# Patient Record
Sex: FEMALE | Race: BLACK OR AFRICAN AMERICAN | NOT HISPANIC OR LATINO | Employment: OTHER | RURAL
[De-identification: names, ages, dates, MRNs, and addresses within clinical notes are randomized per-mention and may not be internally consistent; named-entity substitution may affect disease eponyms.]

---

## 2020-10-12 ENCOUNTER — OFFICE VISIT (OUTPATIENT)
Dept: FAMILY MEDICINE CLINIC | Facility: CLINIC | Age: 45
End: 2020-10-12

## 2020-10-12 VITALS
WEIGHT: 239.8 LBS | BODY MASS INDEX: 44.13 KG/M2 | OXYGEN SATURATION: 99 % | HEART RATE: 113 BPM | HEIGHT: 62 IN | SYSTOLIC BLOOD PRESSURE: 130 MMHG | DIASTOLIC BLOOD PRESSURE: 88 MMHG

## 2020-10-12 DIAGNOSIS — Z13.220 SCREENING CHOLESTEROL LEVEL: ICD-10-CM

## 2020-10-12 DIAGNOSIS — K21.9 GASTROESOPHAGEAL REFLUX DISEASE, UNSPECIFIED WHETHER ESOPHAGITIS PRESENT: ICD-10-CM

## 2020-10-12 DIAGNOSIS — R53.83 FATIGUE, UNSPECIFIED TYPE: ICD-10-CM

## 2020-10-12 DIAGNOSIS — J30.2 SEASONAL ALLERGIES: ICD-10-CM

## 2020-10-12 DIAGNOSIS — J01.00 ACUTE NON-RECURRENT MAXILLARY SINUSITIS: Primary | ICD-10-CM

## 2020-10-12 PROCEDURE — 99203 OFFICE O/P NEW LOW 30 MIN: CPT | Performed by: STUDENT IN AN ORGANIZED HEALTH CARE EDUCATION/TRAINING PROGRAM

## 2020-10-12 RX ORDER — FLUTICASONE PROPIONATE 50 MCG
2 SPRAY, SUSPENSION (ML) NASAL DAILY
Qty: 1 BOTTLE | Refills: 6 | Status: SHIPPED | OUTPATIENT
Start: 2020-10-12 | End: 2022-06-23

## 2020-10-12 RX ORDER — OMEPRAZOLE 40 MG/1
40 CAPSULE, DELAYED RELEASE ORAL DAILY
Qty: 90 CAPSULE | Refills: 0 | Status: SHIPPED | OUTPATIENT
Start: 2020-10-12 | End: 2022-03-22

## 2020-10-12 RX ORDER — AMOXICILLIN AND CLAVULANATE POTASSIUM 875; 125 MG/1; MG/1
1 TABLET, FILM COATED ORAL 2 TIMES DAILY
Qty: 10 TABLET | Refills: 0 | Status: SHIPPED | OUTPATIENT
Start: 2020-10-12 | End: 2021-06-29

## 2020-10-12 RX ORDER — CETIRIZINE HYDROCHLORIDE 10 MG/1
10 TABLET ORAL DAILY
Qty: 30 TABLET | Refills: 5 | Status: SHIPPED | OUTPATIENT
Start: 2020-10-12 | End: 2022-03-22

## 2020-10-12 RX ORDER — ACETAMINOPHEN 500 MG
500 TABLET ORAL EVERY 6 HOURS PRN
COMMUNITY
End: 2023-01-13 | Stop reason: SDUPTHER

## 2020-10-12 NOTE — PROGRESS NOTES
Subjective:  Cb Lord is a 45 y.o. female who presents to establish care    GERD; present for years; has tried Mylanta and Tums with limited relief; has early satiety, epigastric pain, nausea with eating; denies hematochezia, hematemesis; spicy food and milk products are worse;     Sinus infection; has had head fullness for 2 months; poorly controlled seasonal allergies; does not take anything; endorses postnasal drip; morning cough; states has had a subjective fever recently as well as fatigue; associated with headache that is worse with bending over; and decreased hearing of right ear    Former smoker; quit when told she had spots on her lungs; x-ray was taken at [facility]; reports that size is stable; has not had appropriate follow-up; no hemoptysis, chronic cough, shortness of breath, or wheezing    Current Outpatient Medications:   •  acetaminophen (TYLENOL) 500 MG tablet, Take 500 mg by mouth Every 6 (Six) Hours As Needed for Mild Pain ., Disp: , Rfl:   •  amoxicillin-clavulanate (Augmentin) 875-125 MG per tablet, Take 1 tablet by mouth 2 (Two) Times a Day., Disp: 10 tablet, Rfl: 0  •  cetirizine (zyrTEC) 10 MG tablet, Take 1 tablet by mouth Daily., Disp: 30 tablet, Rfl: 5  •  fluticasone (Flonase) 50 MCG/ACT nasal spray, 2 sprays into the nostril(s) as directed by provider Daily., Disp: 1 bottle, Rfl: 6  •  omeprazole (priLOSEC) 40 MG capsule, Take 1 capsule by mouth Daily., Disp: 90 capsule, Rfl: 0    Review of Systems  Review of Systems   Constitutional: Negative for appetite change and fever.   HENT: Positive for congestion, ear discharge, hearing loss, postnasal drip, rhinorrhea, sinus pressure, sinus pain and sore throat.    Eyes: Negative for discharge and visual disturbance.   Respiratory: Negative for cough and shortness of breath.    Cardiovascular: Negative for chest pain, palpitations and leg swelling.   Gastrointestinal: Negative for abdominal pain, diarrhea and vomiting.    Genitourinary: Negative for dysuria.   Skin: Negative for rash.   Neurological: Negative for light-headedness and headaches.   Psychiatric/Behavioral: Negative for agitation.       There is no problem list on file for this patient.    Past Surgical History:   Procedure Laterality Date   • CHOLECYSTECTOMY     • TUBAL ABDOMINAL LIGATION       Social History     Socioeconomic History   • Marital status: Single     Spouse name: Not on file   • Number of children: Not on file   • Years of education: Not on file   • Highest education level: Not on file   Tobacco Use   • Smoking status: Former Smoker   • Smokeless tobacco: Never Used   Substance and Sexual Activity   • Alcohol use: Not Currently   • Drug use: Never     Family History   Problem Relation Age of Onset   • Kidney disease Mother    • Kidney disease Father      No results found for any previous visit.      No image results found.    @[facility]@    There is no immunization history on file for this patient.    The following portions of the patient's history were reviewed and updated as appropriate: allergies, current medications, past family history, past medical history, past social history, past surgical history and problem list.        Physical Exam  Physical Exam  Constitutional:       Appearance: Normal appearance.   HENT:      Head: Normocephalic and atraumatic.        Right Ear: Tympanic membrane normal. There is impacted cerumen.      Left Ear: Tympanic membrane and ear canal normal.   Eyes:      General:         Right eye: No discharge.         Left eye: No discharge.      Conjunctiva/sclera: Conjunctivae normal.   Neck:      Musculoskeletal: Normal range of motion.   Cardiovascular:      Rate and Rhythm: Normal rate and regular rhythm.      Pulses: Normal pulses.      Heart sounds: Normal heart sounds. No murmur.   Pulmonary:      Effort: Pulmonary effort is normal. No respiratory distress.      Breath sounds: Normal breath sounds.   Abdominal:       General: There is no distension.      Palpations: Abdomen is soft.      Tenderness: There is abdominal tenderness ( Epigastric). There is no right CVA tenderness, left CVA tenderness, guarding or rebound.   Lymphadenopathy:      Cervical: No cervical adenopathy.   Neurological:      Mental Status: She is alert. Mental status is at baseline.   Psychiatric:         Mood and Affect: Mood normal.         Behavior: Behavior normal.         Assessment/Plan    Diagnosis Plan   1. Acute non-recurrent maxillary sinusitis  fluticasone (Flonase) 50 MCG/ACT nasal spray    cetirizine (zyrTEC) 10 MG tablet    amoxicillin-clavulanate (Augmentin) 875-125 MG per tablet   2. Gastroesophageal reflux disease, unspecified whether esophagitis present  omeprazole (priLOSEC) 40 MG capsule   3. Seasonal allergies  fluticasone (Flonase) 50 MCG/ACT nasal spray    cetirizine (zyrTEC) 10 MG tablet   4. Fatigue, unspecified type  Comprehensive Metabolic Panel    CBC & Differential   5. Screening cholesterol level  Lipid Panel      Orders Placed This Encounter   Procedures   • Comprehensive Metabolic Panel   • Lipid Panel   • CBC & Differential     Order Specific Question:   Manual Differential     Answer:   No     Uncontrolled allergies and sinusitis; will treat with allergy medications and amoxicillin clavulanate; counseled patient on treatment plan; decreased hearing improved with cerumen impaction removal via irrigation; patient tolerated well; no complications    GERD; will keep food diary; lactose intolerance likely contributory; with epigastric tenderness and early satiety; will start omeprazole; if unimproved will refer to GI for EGD and H. pylori testing    Fatigue; likely from sinus infection, labs as above    Cholesterol screening as above    Follow-up in 2 weeks, will obtain prior records; patient likely needs hep C screening, influenza vaccine, Tdap vaccine, Pap smear and repeat chest x-ray/low dose CT for reported lung mass; lung  CTAB today      This document has been electronically signed by Moshe Villar MD on October 12, 2020 17:43 CDT

## 2021-06-28 ENCOUNTER — TELEPHONE (OUTPATIENT)
Dept: FAMILY MEDICINE CLINIC | Facility: CLINIC | Age: 46
End: 2021-06-28

## 2021-06-29 ENCOUNTER — OFFICE VISIT (OUTPATIENT)
Dept: FAMILY MEDICINE CLINIC | Facility: CLINIC | Age: 46
End: 2021-06-29

## 2021-06-29 VITALS
HEIGHT: 62 IN | OXYGEN SATURATION: 98 % | RESPIRATION RATE: 20 BRPM | SYSTOLIC BLOOD PRESSURE: 122 MMHG | HEART RATE: 103 BPM | DIASTOLIC BLOOD PRESSURE: 84 MMHG | BODY MASS INDEX: 43.43 KG/M2 | WEIGHT: 236 LBS

## 2021-06-29 DIAGNOSIS — E55.9 VITAMIN D DEFICIENCY: ICD-10-CM

## 2021-06-29 DIAGNOSIS — K59.00 CONSTIPATION, UNSPECIFIED CONSTIPATION TYPE: ICD-10-CM

## 2021-06-29 DIAGNOSIS — Z91.89 AT RISK FOR SLEEP APNEA: ICD-10-CM

## 2021-06-29 DIAGNOSIS — I51.7 CARDIOMEGALY: ICD-10-CM

## 2021-06-29 DIAGNOSIS — Z09 HOSPITAL DISCHARGE FOLLOW-UP: Primary | ICD-10-CM

## 2021-06-29 DIAGNOSIS — D50.9 IRON DEFICIENCY ANEMIA, UNSPECIFIED IRON DEFICIENCY ANEMIA TYPE: ICD-10-CM

## 2021-06-29 PROCEDURE — 99214 OFFICE O/P EST MOD 30 MIN: CPT | Performed by: STUDENT IN AN ORGANIZED HEALTH CARE EDUCATION/TRAINING PROGRAM

## 2021-06-29 RX ORDER — AMLODIPINE BESYLATE 5 MG/1
5 TABLET ORAL DAILY
COMMUNITY
End: 2021-07-21 | Stop reason: SDUPTHER

## 2021-06-29 RX ORDER — FERROUS SULFATE 325(65) MG
325 TABLET ORAL
Qty: 90 TABLET | Refills: 3 | Status: SHIPPED | OUTPATIENT
Start: 2021-06-29 | End: 2021-07-19 | Stop reason: SDUPTHER

## 2021-06-29 RX ORDER — FERROUS SULFATE 325(65) MG
325 TABLET ORAL
COMMUNITY
End: 2021-06-29 | Stop reason: SDUPTHER

## 2021-06-29 RX ORDER — LANOLIN ALCOHOL/MO/W.PET/CERES
CREAM (GRAM) TOPICAL DAILY
COMMUNITY
End: 2021-07-19 | Stop reason: SDUPTHER

## 2021-06-29 RX ORDER — POLYETHYLENE GLYCOL 3350 17 G/17G
17 POWDER, FOR SOLUTION ORAL DAILY
Qty: 850 G | Refills: 2 | Status: SHIPPED | OUTPATIENT
Start: 2021-06-29 | End: 2022-03-22

## 2021-07-19 DIAGNOSIS — D50.9 IRON DEFICIENCY ANEMIA, UNSPECIFIED IRON DEFICIENCY ANEMIA TYPE: ICD-10-CM

## 2021-07-19 DIAGNOSIS — E55.9 VITAMIN D DEFICIENCY: ICD-10-CM

## 2021-07-20 ENCOUNTER — TELEPHONE (OUTPATIENT)
Dept: FAMILY MEDICINE CLINIC | Facility: CLINIC | Age: 46
End: 2021-07-20

## 2021-07-20 RX ORDER — LANOLIN ALCOHOL/MO/W.PET/CERES
1000 CREAM (GRAM) TOPICAL DAILY
Qty: 90 TABLET | Refills: 1 | Status: SHIPPED | OUTPATIENT
Start: 2021-07-20 | End: 2022-02-18 | Stop reason: SDUPTHER

## 2021-07-20 RX ORDER — FERROUS SULFATE 325(65) MG
325 TABLET ORAL
Qty: 90 TABLET | Refills: 0 | Status: SHIPPED | OUTPATIENT
Start: 2021-07-20 | End: 2022-02-18 | Stop reason: SDUPTHER

## 2021-07-21 ENCOUNTER — TELEPHONE (OUTPATIENT)
Dept: FAMILY MEDICINE CLINIC | Facility: CLINIC | Age: 46
End: 2021-07-21

## 2021-07-21 DIAGNOSIS — I10 ESSENTIAL HYPERTENSION: Primary | ICD-10-CM

## 2021-07-21 RX ORDER — AMLODIPINE BESYLATE 5 MG/1
5 TABLET ORAL DAILY
Qty: 90 TABLET | Refills: 1 | Status: SHIPPED | OUTPATIENT
Start: 2021-07-21 | End: 2021-11-04 | Stop reason: SDUPTHER

## 2021-08-09 ENCOUNTER — TELEPHONE (OUTPATIENT)
Dept: FAMILY MEDICINE CLINIC | Facility: CLINIC | Age: 46
End: 2021-08-09

## 2021-08-18 ENCOUNTER — TELEPHONE (OUTPATIENT)
Dept: FAMILY MEDICINE CLINIC | Facility: CLINIC | Age: 46
End: 2021-08-18

## 2021-08-18 NOTE — TELEPHONE ENCOUNTER
Pt's daughter called stating she needed refills on her BP meds and on her Iron. I looked and both medications have been filled on 7/20 and 7/21 and were for a 3 month supply. I told her that if they filled it partially, the number of pills that she was given would be on the bottle. I told her it was too soon to refill either one of them and she needed to call the pharmacy to make sure she got the right amount of pills. I asked if there was a possibility that pt took more than she was supposed to or if they could have accidentally spilled out and she denied both of those suggestions. She said she would call the pharmacy back.

## 2021-11-04 DIAGNOSIS — I10 ESSENTIAL HYPERTENSION: ICD-10-CM

## 2021-11-04 RX ORDER — AMLODIPINE BESYLATE 5 MG/1
5 TABLET ORAL DAILY
Qty: 90 TABLET | Refills: 1 | Status: SHIPPED | OUTPATIENT
Start: 2021-11-04 | End: 2022-03-22 | Stop reason: SDUPTHER

## 2021-11-04 NOTE — TELEPHONE ENCOUNTER
Rx Refill Note  Requested Prescriptions     Pending Prescriptions Disp Refills   • amLODIPine (NORVASC) 5 MG tablet 90 tablet 1     Sig: Take 1 tablet by mouth Daily.      Last office visit with prescribing clinician: 6/29/2021      Next office visit with prescribing clinician: 11/11/2021            Kwame Garcia Rep  11/04/21, 11:28 CDT     Potassium is 3.8 and her GFR is > 60. ([facility] lab results)

## 2021-11-10 ENCOUNTER — TELEPHONE (OUTPATIENT)
Dept: FAMILY MEDICINE CLINIC | Facility: CLINIC | Age: 46
End: 2021-11-10

## 2021-11-11 ENCOUNTER — LAB (OUTPATIENT)
Dept: LAB | Facility: HOSPITAL | Age: 46
End: 2021-11-11

## 2021-11-11 ENCOUNTER — OFFICE VISIT (OUTPATIENT)
Dept: FAMILY MEDICINE CLINIC | Facility: CLINIC | Age: 46
End: 2021-11-11

## 2021-11-11 VITALS
SYSTOLIC BLOOD PRESSURE: 140 MMHG | WEIGHT: 254 LBS | DIASTOLIC BLOOD PRESSURE: 90 MMHG | BODY MASS INDEX: 46.74 KG/M2 | HEIGHT: 62 IN | TEMPERATURE: 97.1 F | HEART RATE: 93 BPM | OXYGEN SATURATION: 99 %

## 2021-11-11 DIAGNOSIS — Z13.220 LIPID SCREENING: ICD-10-CM

## 2021-11-11 DIAGNOSIS — D50.9 IRON DEFICIENCY ANEMIA, UNSPECIFIED IRON DEFICIENCY ANEMIA TYPE: ICD-10-CM

## 2021-11-11 DIAGNOSIS — I10 ESSENTIAL HYPERTENSION: Primary | ICD-10-CM

## 2021-11-11 DIAGNOSIS — Z11.59 NEED FOR HEPATITIS C SCREENING TEST: ICD-10-CM

## 2021-11-11 LAB
ALBUMIN SERPL-MCNC: 4 G/DL (ref 3.5–5.2)
ALBUMIN/GLOB SERPL: 1.1 G/DL
ALP SERPL-CCNC: 91 U/L (ref 39–117)
ALT SERPL W P-5'-P-CCNC: 9 U/L (ref 1–33)
ANION GAP SERPL CALCULATED.3IONS-SCNC: 14.2 MMOL/L (ref 5–15)
AST SERPL-CCNC: 10 U/L (ref 1–32)
BASOPHILS # BLD AUTO: 0.03 10*3/MM3 (ref 0–0.2)
BASOPHILS NFR BLD AUTO: 0.3 % (ref 0–1.5)
BILIRUB SERPL-MCNC: 0.2 MG/DL (ref 0–1.2)
BUN SERPL-MCNC: 8 MG/DL (ref 6–20)
BUN/CREAT SERPL: 12.1 (ref 7–25)
CALCIUM SPEC-SCNC: 9.4 MG/DL (ref 8.6–10.5)
CHLORIDE SERPL-SCNC: 105 MMOL/L (ref 98–107)
CHOLEST SERPL-MCNC: 164 MG/DL (ref 0–200)
CO2 SERPL-SCNC: 23.8 MMOL/L (ref 22–29)
CREAT SERPL-MCNC: 0.66 MG/DL (ref 0.57–1)
DEPRECATED RDW RBC AUTO: 38.8 FL (ref 37–54)
EOSINOPHIL # BLD AUTO: 0.24 10*3/MM3 (ref 0–0.4)
EOSINOPHIL NFR BLD AUTO: 2.3 % (ref 0.3–6.2)
ERYTHROCYTE [DISTWIDTH] IN BLOOD BY AUTOMATED COUNT: 14.1 % (ref 12.3–15.4)
FERRITIN SERPL-MCNC: 24.4 NG/ML (ref 13–150)
GFR SERPL CREATININE-BSD FRML MDRD: 118 ML/MIN/1.73
GLOBULIN UR ELPH-MCNC: 3.8 GM/DL
GLUCOSE SERPL-MCNC: 96 MG/DL (ref 65–99)
HCT VFR BLD AUTO: 41.3 % (ref 34–46.6)
HDLC SERPL-MCNC: 49 MG/DL (ref 40–60)
HGB BLD-MCNC: 13.4 G/DL (ref 12–15.9)
IMM GRANULOCYTES # BLD AUTO: 0.04 10*3/MM3 (ref 0–0.05)
IMM GRANULOCYTES NFR BLD AUTO: 0.4 % (ref 0–0.5)
IRON 24H UR-MRATE: 96 MCG/DL (ref 37–145)
IRON SATN MFR SERPL: 22 % (ref 20–50)
LDLC SERPL CALC-MCNC: 93 MG/DL (ref 0–100)
LDLC/HDLC SERPL: 1.84 {RATIO}
LYMPHOCYTES # BLD AUTO: 1.81 10*3/MM3 (ref 0.7–3.1)
LYMPHOCYTES NFR BLD AUTO: 17.5 % (ref 19.6–45.3)
MCH RBC QN AUTO: 25.3 PG (ref 26.6–33)
MCHC RBC AUTO-ENTMCNC: 32.4 G/DL (ref 31.5–35.7)
MCV RBC AUTO: 77.9 FL (ref 79–97)
MONOCYTES # BLD AUTO: 0.49 10*3/MM3 (ref 0.1–0.9)
MONOCYTES NFR BLD AUTO: 4.7 % (ref 5–12)
NEUTROPHILS NFR BLD AUTO: 7.75 10*3/MM3 (ref 1.7–7)
NEUTROPHILS NFR BLD AUTO: 74.8 % (ref 42.7–76)
NRBC BLD AUTO-RTO: 0 /100 WBC (ref 0–0.2)
PLATELET # BLD AUTO: 164 10*3/MM3 (ref 140–450)
PMV BLD AUTO: 12.5 FL (ref 6–12)
POTASSIUM SERPL-SCNC: 3.9 MMOL/L (ref 3.5–5.2)
PROT SERPL-MCNC: 7.8 G/DL (ref 6–8.5)
RBC # BLD AUTO: 5.3 10*6/MM3 (ref 3.77–5.28)
SODIUM SERPL-SCNC: 143 MMOL/L (ref 136–145)
TIBC SERPL-MCNC: 443 MCG/DL (ref 298–536)
TRANSFERRIN SERPL-MCNC: 297 MG/DL (ref 200–360)
TRIGL SERPL-MCNC: 124 MG/DL (ref 0–150)
VLDLC SERPL-MCNC: 22 MG/DL (ref 5–40)
WBC # BLD AUTO: 10.36 10*3/MM3 (ref 3.4–10.8)

## 2021-11-11 PROCEDURE — 85025 COMPLETE CBC W/AUTO DIFF WBC: CPT | Performed by: STUDENT IN AN ORGANIZED HEALTH CARE EDUCATION/TRAINING PROGRAM

## 2021-11-11 PROCEDURE — 80061 LIPID PANEL: CPT | Performed by: STUDENT IN AN ORGANIZED HEALTH CARE EDUCATION/TRAINING PROGRAM

## 2021-11-11 PROCEDURE — 82728 ASSAY OF FERRITIN: CPT | Performed by: STUDENT IN AN ORGANIZED HEALTH CARE EDUCATION/TRAINING PROGRAM

## 2021-11-11 PROCEDURE — 80053 COMPREHEN METABOLIC PANEL: CPT | Performed by: STUDENT IN AN ORGANIZED HEALTH CARE EDUCATION/TRAINING PROGRAM

## 2021-11-11 PROCEDURE — 86803 HEPATITIS C AB TEST: CPT | Performed by: STUDENT IN AN ORGANIZED HEALTH CARE EDUCATION/TRAINING PROGRAM

## 2021-11-11 PROCEDURE — 84466 ASSAY OF TRANSFERRIN: CPT | Performed by: STUDENT IN AN ORGANIZED HEALTH CARE EDUCATION/TRAINING PROGRAM

## 2021-11-11 PROCEDURE — 83540 ASSAY OF IRON: CPT | Performed by: STUDENT IN AN ORGANIZED HEALTH CARE EDUCATION/TRAINING PROGRAM

## 2021-11-11 PROCEDURE — 99214 OFFICE O/P EST MOD 30 MIN: CPT | Performed by: STUDENT IN AN ORGANIZED HEALTH CARE EDUCATION/TRAINING PROGRAM

## 2021-11-11 NOTE — PROGRESS NOTES
"Subjective:  Risa Vivar is a 46 y.o. female who presents for     HTN; states that blood pressure is usually elevated in the clinic due white coat syndrome. Currently on amlodipine 5mg daily. No issues with medication    Iron deficiency anemia; States had a hysterectomy due to fibroids, menorrhagia in September. States that since has been doing well, recently had f/u with OB/GYN and was told she was healing well. No issues with abdominal pain, nausea, vomiting, diarrhea or constipation. No dysuria, hematuria or flank pain.     There is no problem list on file for this patient.    Vitals:    Vitals:    11/11/21 0829   BP: 140/90   BP Location: Left arm   Patient Position: Sitting   Cuff Size: Large Adult   Pulse: 93   Temp: 97.1 °F (36.2 °C)   SpO2: 99%   Weight: 115 kg (254 lb)   Height: 157.5 cm (62\")     Body mass index is 46.46 kg/m².    Current Outpatient Medications:   •  acetaminophen (TYLENOL) 500 MG tablet, Take 500 mg by mouth Every 6 (Six) Hours As Needed for Mild Pain ., Disp: , Rfl:   •  amLODIPine (NORVASC) 5 MG tablet, Take 1 tablet by mouth Daily., Disp: 90 tablet, Rfl: 1  •  calcium-vitamin D (Oscal 500/200 D-3) 500-200 MG-UNIT per tablet, Take 1 tablet by mouth Daily., Disp: 90 tablet, Rfl: 3  •  cetirizine (zyrTEC) 10 MG tablet, Take 1 tablet by mouth Daily., Disp: 30 tablet, Rfl: 5  •  ferrous sulfate 325 (65 FE) MG tablet, Take 1 tablet by mouth Daily With Breakfast., Disp: 90 tablet, Rfl: 0  •  fluticasone (Flonase) 50 MCG/ACT nasal spray, 2 sprays into the nostril(s) as directed by provider Daily., Disp: 1 bottle, Rfl: 6  •  omeprazole (priLOSEC) 40 MG capsule, Take 1 capsule by mouth Daily., Disp: 90 capsule, Rfl: 0  •  polyethylene glycol (GlycoLax) 17 GM/SCOOP powder, Take 17 g by mouth Daily., Disp: 850 g, Rfl: 2  •  vitamin B-12 (CYANOCOBALAMIN) 1000 MCG tablet, Take 1 tablet by mouth Daily., Disp: 90 tablet, Rfl: 1    There is no problem list on file for this patient.    Past Surgical " History:   Procedure Laterality Date   • CHOLECYSTECTOMY     • HYSTERECTOMY     • TUBAL ABDOMINAL LIGATION       Social History     Socioeconomic History   • Marital status: Single   Tobacco Use   • Smoking status: Former Smoker   • Smokeless tobacco: Never Used   Vaping Use   • Vaping Use: Never used   Substance and Sexual Activity   • Alcohol use: Not Currently   • Drug use: Never   • Sexual activity: Defer     Family History   Problem Relation Age of Onset   • Kidney disease Mother    • Kidney disease Father      No visits with results within 6 Month(s) from this visit.   Latest known visit with results is:   No results found for any previous visit.      No image results found.      [email or web address]    There is no immunization history on file for this patient.  The following portions of the patient's history were reviewed and updated as appropriate: allergies, current medications, past family history, past medical history, past social history, past surgical history and problem list.    PHQ-9 Total Score: 0           Physical Exam  Constitutional:       Appearance: Normal appearance.   HENT:      Head: Normocephalic and atraumatic.      Right Ear: External ear normal.      Left Ear: External ear normal.   Eyes:      General:         Right eye: No discharge.         Left eye: No discharge.      Conjunctiva/sclera: Conjunctivae normal.   Cardiovascular:      Rate and Rhythm: Normal rate and regular rhythm.      Pulses: Normal pulses.      Heart sounds: Normal heart sounds. No murmur heard.      Pulmonary:      Effort: Pulmonary effort is normal. No respiratory distress.      Breath sounds: Normal breath sounds.   Abdominal:      General: There is no distension.      Palpations: Abdomen is soft.      Tenderness: There is no abdominal tenderness.   Musculoskeletal:      Cervical back: Normal range of motion.      Right lower leg: No edema.      Left lower leg: No edema.   Lymphadenopathy:      Cervical: No cervical  adenopathy.   Neurological:      Mental Status: She is alert. Mental status is at baseline.   Psychiatric:         Mood and Affect: Mood normal.         Behavior: Behavior normal.       Assessment/Plan    Diagnosis Plan   1. Essential hypertension  CBC & Differential    Comprehensive Metabolic Panel   2. Need for hepatitis C screening test  HCV Antibody Rfx To Qnt PCR   3. Lipid screening  Lipid Panel   4. Iron deficiency anemia, unspecified iron deficiency anemia type  Ferritin    Iron Profile      Orders Placed This Encounter   Procedures   • Comprehensive Metabolic Panel     Order Specific Question:   Release to patient     Answer:   Immediate   • Lipid Panel   • HCV Antibody Rfx To Qnt PCR     Order Specific Question:   Release to patient     Answer:   Immediate   • Ferritin     Order Specific Question:   Release to patient     Answer:   Immediate   • Iron Profile   • CBC & Differential     Order Specific Question:   Manual Differential     Answer:   No     Hypertension; patient states blood pressure well controlled at home, will bring in blood pressure log to follow-up in 3 months. Labs as above. Continue current medications.    Health maintenance; needs hep C screening, lipid screening, labs above, will obtain. Counseled on importance of vaccines, patient declined today, will get Covid vaccine at pharmacy as soon as possible.    Iron deficiency anemia; will obtain labs above, discontinue iron if no longer needed as patient had hysterectomy, and hopefully etiology of anemia has resolved.          This document has been electronically signed by Moshe Villar MD on November 11, 2021 09:24 CST

## 2021-11-12 LAB
HCV AB S/CO SERPL IA: <0.1 S/CO RATIO (ref 0–0.9)
HCV AB SERPL QL IA: NORMAL

## 2021-12-07 ENCOUNTER — TELEPHONE (OUTPATIENT)
Dept: FAMILY MEDICINE CLINIC | Facility: CLINIC | Age: 46
End: 2021-12-07

## 2022-01-27 ENCOUNTER — TELEPHONE (OUTPATIENT)
Dept: FAMILY MEDICINE CLINIC | Facility: CLINIC | Age: 47
End: 2022-01-27

## 2022-01-27 NOTE — TELEPHONE ENCOUNTER
Left detailed message for patient to call back to reschedule appointment due to provider out of office.

## 2022-02-18 DIAGNOSIS — D50.9 IRON DEFICIENCY ANEMIA, UNSPECIFIED IRON DEFICIENCY ANEMIA TYPE: ICD-10-CM

## 2022-02-18 RX ORDER — FERROUS SULFATE 325(65) MG
325 TABLET ORAL
Qty: 90 TABLET | Refills: 0 | Status: SHIPPED | OUTPATIENT
Start: 2022-02-18 | End: 2023-01-13 | Stop reason: SDUPTHER

## 2022-02-18 RX ORDER — LANOLIN ALCOHOL/MO/W.PET/CERES
1000 CREAM (GRAM) TOPICAL DAILY
Qty: 90 TABLET | Refills: 0 | Status: SHIPPED | OUTPATIENT
Start: 2022-02-18 | End: 2022-07-25 | Stop reason: SDUPTHER

## 2022-03-22 ENCOUNTER — OFFICE VISIT (OUTPATIENT)
Dept: FAMILY MEDICINE CLINIC | Facility: CLINIC | Age: 47
End: 2022-03-22

## 2022-03-22 VITALS
HEIGHT: 62 IN | DIASTOLIC BLOOD PRESSURE: 110 MMHG | TEMPERATURE: 97.1 F | HEART RATE: 98 BPM | SYSTOLIC BLOOD PRESSURE: 150 MMHG | OXYGEN SATURATION: 99 % | BODY MASS INDEX: 47.66 KG/M2 | WEIGHT: 259 LBS

## 2022-03-22 DIAGNOSIS — I10 ESSENTIAL HYPERTENSION: Primary | ICD-10-CM

## 2022-03-22 DIAGNOSIS — Z12.11 ENCOUNTER FOR SCREENING FOR MALIGNANT NEOPLASM OF COLON: ICD-10-CM

## 2022-03-22 PROCEDURE — 99213 OFFICE O/P EST LOW 20 MIN: CPT | Performed by: STUDENT IN AN ORGANIZED HEALTH CARE EDUCATION/TRAINING PROGRAM

## 2022-03-22 RX ORDER — AMLODIPINE BESYLATE 5 MG/1
5 TABLET ORAL DAILY
Qty: 90 TABLET | Refills: 3 | Status: SHIPPED | OUTPATIENT
Start: 2022-03-22 | End: 2022-06-09 | Stop reason: SDUPTHER

## 2022-06-09 DIAGNOSIS — I10 ESSENTIAL HYPERTENSION: ICD-10-CM

## 2022-06-09 RX ORDER — AMLODIPINE BESYLATE 5 MG/1
5 TABLET ORAL DAILY
Qty: 90 TABLET | Refills: 3 | Status: SHIPPED | OUTPATIENT
Start: 2022-06-09 | End: 2023-02-13

## 2022-06-23 ENCOUNTER — OFFICE VISIT (OUTPATIENT)
Dept: FAMILY MEDICINE CLINIC | Facility: CLINIC | Age: 47
End: 2022-06-23

## 2022-06-23 VITALS
OXYGEN SATURATION: 100 % | HEIGHT: 62 IN | TEMPERATURE: 97.1 F | WEIGHT: 273 LBS | BODY MASS INDEX: 50.24 KG/M2 | SYSTOLIC BLOOD PRESSURE: 158 MMHG | DIASTOLIC BLOOD PRESSURE: 106 MMHG | HEART RATE: 86 BPM

## 2022-06-23 DIAGNOSIS — Z12.11 SCREEN FOR COLON CANCER: ICD-10-CM

## 2022-06-23 DIAGNOSIS — I10 PRIMARY HYPERTENSION: ICD-10-CM

## 2022-06-23 DIAGNOSIS — F32.1 CURRENT MODERATE EPISODE OF MAJOR DEPRESSIVE DISORDER WITHOUT PRIOR EPISODE: Primary | ICD-10-CM

## 2022-06-23 PROCEDURE — 99214 OFFICE O/P EST MOD 30 MIN: CPT | Performed by: STUDENT IN AN ORGANIZED HEALTH CARE EDUCATION/TRAINING PROGRAM

## 2022-06-23 RX ORDER — CHLORTHALIDONE 25 MG/1
25 TABLET ORAL DAILY
Qty: 60 TABLET | Refills: 0 | Status: SHIPPED | OUTPATIENT
Start: 2022-06-23 | End: 2022-07-25 | Stop reason: SDUPTHER

## 2022-06-23 RX ORDER — LOSARTAN POTASSIUM 25 MG/1
25 TABLET ORAL DAILY
Qty: 60 TABLET | Refills: 0 | Status: SHIPPED | OUTPATIENT
Start: 2022-06-23 | End: 2022-07-25 | Stop reason: SDUPTHER

## 2022-07-06 ENCOUNTER — TELEPHONE (OUTPATIENT)
Dept: FAMILY MEDICINE CLINIC | Facility: CLINIC | Age: 47
End: 2022-07-06

## 2022-07-25 ENCOUNTER — OFFICE VISIT (OUTPATIENT)
Dept: FAMILY MEDICINE CLINIC | Facility: CLINIC | Age: 47
End: 2022-07-25

## 2022-07-25 VITALS
TEMPERATURE: 97.3 F | SYSTOLIC BLOOD PRESSURE: 136 MMHG | HEART RATE: 96 BPM | BODY MASS INDEX: 51.34 KG/M2 | DIASTOLIC BLOOD PRESSURE: 94 MMHG | HEIGHT: 62 IN | WEIGHT: 279 LBS | OXYGEN SATURATION: 99 %

## 2022-07-25 DIAGNOSIS — Z12.11 SCREEN FOR COLON CANCER: ICD-10-CM

## 2022-07-25 DIAGNOSIS — E53.8 VITAMIN B12 DEFICIENCY: ICD-10-CM

## 2022-07-25 DIAGNOSIS — F32.1 CURRENT MODERATE EPISODE OF MAJOR DEPRESSIVE DISORDER WITHOUT PRIOR EPISODE: ICD-10-CM

## 2022-07-25 DIAGNOSIS — I10 PRIMARY HYPERTENSION: Primary | ICD-10-CM

## 2022-07-25 PROCEDURE — 99213 OFFICE O/P EST LOW 20 MIN: CPT | Performed by: STUDENT IN AN ORGANIZED HEALTH CARE EDUCATION/TRAINING PROGRAM

## 2022-07-25 RX ORDER — LANOLIN ALCOHOL/MO/W.PET/CERES
1000 CREAM (GRAM) TOPICAL DAILY
Qty: 90 TABLET | Refills: 3 | Status: SHIPPED | OUTPATIENT
Start: 2022-07-25 | End: 2023-02-13 | Stop reason: SDUPTHER

## 2022-07-25 RX ORDER — LOSARTAN POTASSIUM 25 MG/1
25 TABLET ORAL DAILY
Qty: 90 TABLET | Refills: 1 | Status: SHIPPED | OUTPATIENT
Start: 2022-07-25 | End: 2023-01-13 | Stop reason: SDUPTHER

## 2022-07-25 RX ORDER — CHLORTHALIDONE 25 MG/1
25 TABLET ORAL DAILY
Qty: 90 TABLET | Refills: 1 | Status: SHIPPED | OUTPATIENT
Start: 2022-07-25 | End: 2023-02-06

## 2022-09-12 ENCOUNTER — TELEPHONE (OUTPATIENT)
Dept: FAMILY MEDICINE CLINIC | Facility: CLINIC | Age: 47
End: 2022-09-12

## 2022-09-12 NOTE — TELEPHONE ENCOUNTER
Called pt to remind her about labs. She said she is having a colonoscopy done instead of the cologuard. She is scheduled for a colonoscopy on 9/23 with Dr Valerio. She said she will be here on 9/21 to get her blood drawn after she gets her covid test at  for her procedure.

## 2022-09-21 NOTE — TELEPHONE ENCOUNTER
Patient's daughter called asking where she needed to go for the covid test. I told her she might need to get it done at [facility] so they would have the results and there wouldn't be any delay in her procedure. She said that we gave her the script for the covid test but I informed her that we didn't give her any script for a covid test and that she needs to call Dr Valerio office. She voiced understanding.

## 2022-09-23 NOTE — TELEPHONE ENCOUNTER
Rx Refill Note  Requested Prescriptions     Pending Prescriptions Disp Refills   • Oyster Shell Calcium Plus D 500-200 MG-UNIT tablet [Pharmacy Med Name: OYSTER SHELL CALCIUM + D TAB] 30 tablet 0     Sig: Take 1 tablet by mouth Daily.      Last office visit with prescribing clinician: 7/25/2022      Next office visit with prescribing clinician: 10/25/2022            No Mccollum MA  09/23/22, 09:20 CDT

## 2023-01-13 DIAGNOSIS — D50.9 IRON DEFICIENCY ANEMIA, UNSPECIFIED IRON DEFICIENCY ANEMIA TYPE: ICD-10-CM

## 2023-01-13 DIAGNOSIS — I10 PRIMARY HYPERTENSION: ICD-10-CM

## 2023-01-13 DIAGNOSIS — E53.8 VITAMIN B12 DEFICIENCY: ICD-10-CM

## 2023-01-13 DIAGNOSIS — I10 ESSENTIAL HYPERTENSION: ICD-10-CM

## 2023-01-13 RX ORDER — CALCIUM CARBONATE/VITAMIN D3 500MG-5MCG
1 TABLET ORAL DAILY
Qty: 30 TABLET | Refills: 0 | Status: CANCELLED | OUTPATIENT
Start: 2023-01-13

## 2023-01-13 RX ORDER — FERROUS SULFATE 325(65) MG
325 TABLET ORAL
Qty: 90 TABLET | Refills: 0 | Status: SHIPPED | OUTPATIENT
Start: 2023-01-13

## 2023-01-13 RX ORDER — ACETAMINOPHEN 500 MG
500 TABLET ORAL EVERY 6 HOURS PRN
Qty: 90 TABLET | Refills: 3 | Status: SHIPPED | OUTPATIENT
Start: 2023-01-13

## 2023-01-13 RX ORDER — LOSARTAN POTASSIUM 25 MG/1
25 TABLET ORAL DAILY
Qty: 90 TABLET | Refills: 1 | Status: SHIPPED | OUTPATIENT
Start: 2023-01-13

## 2023-01-13 RX ORDER — LANOLIN ALCOHOL/MO/W.PET/CERES
1000 CREAM (GRAM) TOPICAL DAILY
Qty: 90 TABLET | Refills: 3 | Status: CANCELLED | OUTPATIENT
Start: 2023-01-13

## 2023-01-13 RX ORDER — AMLODIPINE BESYLATE 5 MG/1
5 TABLET ORAL DAILY
Qty: 90 TABLET | Refills: 3 | Status: CANCELLED | OUTPATIENT
Start: 2023-01-13

## 2023-01-13 NOTE — TELEPHONE ENCOUNTER
Rx Refill Note  Requested Prescriptions     Pending Prescriptions Disp Refills   • amLODIPine (NORVASC) 5 MG tablet 90 tablet 3     Sig: Take 1 tablet by mouth Daily.   • acetaminophen (TYLENOL) 500 MG tablet       Sig: Take 1 tablet by mouth Every 6 (Six) Hours As Needed for Mild Pain.   • ferrous sulfate 325 (65 FE) MG tablet 90 tablet 0     Sig: Take 1 tablet by mouth Daily With Breakfast.   • losartan (Cozaar) 25 MG tablet 90 tablet 1     Sig: Take 1 tablet by mouth Daily.   • vitamin B-12 (CYANOCOBALAMIN) 1000 MCG tablet 90 tablet 3     Sig: Take 1 tablet by mouth Daily.      Last office visit with prescribing clinician: 7/25/2022     Next office visit with prescribing clinician: 2/13/2023                         No Mccollum MA  01/13/23, 12:07 CST     On 6/9/22, Dr Villar sent in a 90 day supply of Amlodipine with 3 refills.  On 7/25/22, Dr Villar sent in a 90 day supply of Vit B12 with 3 refills

## 2023-02-06 DIAGNOSIS — I10 PRIMARY HYPERTENSION: ICD-10-CM

## 2023-02-06 RX ORDER — CHLORTHALIDONE 25 MG/1
TABLET ORAL
Qty: 90 TABLET | Refills: 0 | Status: SHIPPED | OUTPATIENT
Start: 2023-02-06

## 2023-02-06 NOTE — TELEPHONE ENCOUNTER
Rx Refill Note  Requested Prescriptions     Pending Prescriptions Disp Refills   • chlorthalidone (HYGROTON) 25 MG tablet [Pharmacy Med Name: CHLORTHALIDONE 25 MG TABLET] 90 tablet 0     Sig: TAKE ONE TABLET BY MOUTH EVERY DAY      Last office visit with prescribing clinician: 7/25/2022   Last telemedicine visit with prescribing clinician: 2/13/2023   Next office visit with prescribing clinician: 2/13/2023   {TIP  Encounters:    Diuretics Protocol Failed 02/06/2023 02:27 PM   Protocol Details  Normal serum potassium in past 12 months    Normal serum sodium in past 12 months    GFR> 30 ml/min in past year         {TIP  Please add Last Relevant Lab Date if appropriate              {TIP  Is Refill Pharmacy correct? yes    Would you like a call back once the refill request has been completed: [] Yes [] No    If the office needs to give you a call back, can they leave a voicemail: [] Yes [] No    Shalonda Robledo LPN  02/06/23, 16:55 CST

## 2023-02-13 ENCOUNTER — OFFICE VISIT (OUTPATIENT)
Dept: FAMILY MEDICINE CLINIC | Facility: CLINIC | Age: 48
End: 2023-02-13
Payer: COMMERCIAL

## 2023-02-13 VITALS
OXYGEN SATURATION: 96 % | SYSTOLIC BLOOD PRESSURE: 124 MMHG | TEMPERATURE: 97.7 F | HEART RATE: 120 BPM | BODY MASS INDEX: 49.19 KG/M2 | WEIGHT: 267.3 LBS | HEIGHT: 62 IN | DIASTOLIC BLOOD PRESSURE: 90 MMHG

## 2023-02-13 DIAGNOSIS — Z13.220 LIPID SCREENING: ICD-10-CM

## 2023-02-13 DIAGNOSIS — I10 PRIMARY HYPERTENSION: Primary | ICD-10-CM

## 2023-02-13 DIAGNOSIS — E55.9 VITAMIN D DEFICIENCY: ICD-10-CM

## 2023-02-13 DIAGNOSIS — I10 ESSENTIAL HYPERTENSION: ICD-10-CM

## 2023-02-13 DIAGNOSIS — D50.9 IRON DEFICIENCY ANEMIA, UNSPECIFIED IRON DEFICIENCY ANEMIA TYPE: ICD-10-CM

## 2023-02-13 DIAGNOSIS — E53.8 VITAMIN B12 DEFICIENCY: ICD-10-CM

## 2023-02-13 PROCEDURE — 99213 OFFICE O/P EST LOW 20 MIN: CPT | Performed by: STUDENT IN AN ORGANIZED HEALTH CARE EDUCATION/TRAINING PROGRAM

## 2023-02-13 RX ORDER — CALCIUM CARBONATE/VITAMIN D3 500MG-5MCG
1 TABLET ORAL DAILY
Qty: 90 TABLET | Refills: 1 | Status: SHIPPED | OUTPATIENT
Start: 2023-02-13

## 2023-02-13 RX ORDER — LANOLIN ALCOHOL/MO/W.PET/CERES
1000 CREAM (GRAM) TOPICAL DAILY
Qty: 90 TABLET | Refills: 3 | Status: SHIPPED | OUTPATIENT
Start: 2023-02-13

## 2023-02-13 RX ORDER — AMLODIPINE BESYLATE 10 MG/1
10 TABLET ORAL DAILY
Qty: 90 TABLET | Refills: 3 | Status: SHIPPED | OUTPATIENT
Start: 2023-02-13

## 2023-02-13 NOTE — PROGRESS NOTES
"Subjective:  Risa Vivar is a 47 y.o. female who presents for     Hypertension; currently on amlodipine 10 mg daily, chlorthalidone 25 mg daily, losartan 25 mg daily.  States that blood pressure has been well controlled.  Does not have any issues with medications. Denies any cardiac symptoms, chest pain, shortness of breath, headaches, vision changes, numbness, tingling, weakness, leg swelling, orthopnea, dyspnea on exertion.    There is no problem list on file for this patient.    Vitals:    Vitals:    02/13/23 1545   BP: 124/90   BP Location: Right arm   Patient Position: Sitting   Cuff Size: Large Adult   Pulse: 120   Temp: 97.7 °F (36.5 °C)   SpO2: 96%   Weight: 121 kg (267 lb 4.8 oz)   Height: 157.5 cm (62\")     Body mass index is 48.89 kg/m².      Current Outpatient Medications:   •  acetaminophen (TYLENOL) 500 MG tablet, Take 1 tablet by mouth Every 6 (Six) Hours As Needed for Mild Pain., Disp: 90 tablet, Rfl: 3  •  amLODIPine (NORVASC) 10 MG tablet, Take 1 tablet by mouth Daily., Disp: 90 tablet, Rfl: 3  •  Calcium Carb-Cholecalciferol (Oyster Shell Calcium Plus D) 500-5 MG-MCG tablet per tablet, Take 1 tablet by mouth Daily., Disp: 90 tablet, Rfl: 1  •  chlorthalidone (HYGROTON) 25 MG tablet, TAKE ONE TABLET BY MOUTH EVERY DAY, Disp: 90 tablet, Rfl: 0  •  ferrous sulfate 325 (65 FE) MG tablet, Take 1 tablet by mouth Daily With Breakfast., Disp: 90 tablet, Rfl: 0  •  losartan (Cozaar) 25 MG tablet, Take 1 tablet by mouth Daily., Disp: 90 tablet, Rfl: 1  •  vitamin B-12 (CYANOCOBALAMIN) 1000 MCG tablet, Take 1 tablet by mouth Daily., Disp: 90 tablet, Rfl: 3    There is no problem list on file for this patient.    Past Surgical History:   Procedure Laterality Date   • CHOLECYSTECTOMY     • HYSTERECTOMY     • TUBAL ABDOMINAL LIGATION       Social History     Socioeconomic History   • Marital status: Single   Tobacco Use   • Smoking status: Former   • Smokeless tobacco: Never   Vaping Use   • Vaping Use: Never " used   Substance and Sexual Activity   • Alcohol use: Not Currently   • Drug use: Never   • Sexual activity: Defer     Family History   Problem Relation Age of Onset   • Kidney disease Mother    • Kidney disease Father      No visits with results within 6 Month(s) from this visit.   Latest known visit with results is:   Office Visit on 11/11/2021   Component Date Value Ref Range Status   • Glucose 11/11/2021 96  65 - 99 mg/dL Final   • BUN 11/11/2021 8  6 - 20 mg/dL Final   • Creatinine 11/11/2021 0.66  0.57 - 1.00 mg/dL Final   • Sodium 11/11/2021 143  136 - 145 mmol/L Final   • Potassium 11/11/2021 3.9  3.5 - 5.2 mmol/L Final   • Chloride 11/11/2021 105  98 - 107 mmol/L Final   • CO2 11/11/2021 23.8  22.0 - 29.0 mmol/L Final   • Calcium 11/11/2021 9.4  8.6 - 10.5 mg/dL Final   • Total Protein 11/11/2021 7.8  6.0 - 8.5 g/dL Final   • Albumin 11/11/2021 4.00  3.50 - 5.20 g/dL Final   • ALT (SGPT) 11/11/2021 9  1 - 33 U/L Final   • AST (SGOT) 11/11/2021 10  1 - 32 U/L Final   • Alkaline Phosphatase 11/11/2021 91  39 - 117 U/L Final   • Total Bilirubin 11/11/2021 0.2  0.0 - 1.2 mg/dL Final   • eGFR   Amer 11/11/2021 118  >60 mL/min/1.73 Final   • Globulin 11/11/2021 3.8  gm/dL Final   • A/G Ratio 11/11/2021 1.1  g/dL Final   • BUN/Creatinine Ratio 11/11/2021 12.1  7.0 - 25.0 Final   • Anion Gap 11/11/2021 14.2  5.0 - 15.0 mmol/L Final   • Total Cholesterol 11/11/2021 164  0 - 200 mg/dL Final   • Triglycerides 11/11/2021 124  0 - 150 mg/dL Final   • HDL Cholesterol 11/11/2021 49  40 - 60 mg/dL Final   • LDL Cholesterol  11/11/2021 93  0 - 100 mg/dL Final   • VLDL Cholesterol 11/11/2021 22  5 - 40 mg/dL Final   • LDL/HDL Ratio 11/11/2021 1.84   Final   • Hepatitis C Ab 11/11/2021 <0.1  0.0 - 0.9 s/co ratio Final   • Ferritin 11/11/2021 24.40  13.00 - 150.00 ng/mL Final   • Iron 11/11/2021 96  37 - 145 mcg/dL Final   • Iron Saturation 11/11/2021 22  20 - 50 % Final   • Transferrin 11/11/2021 297  200 - 360 mg/dL  Final   • TIBC 11/11/2021 443  298 - 536 mcg/dL Final   • WBC 11/11/2021 10.36  3.40 - 10.80 10*3/mm3 Final   • RBC 11/11/2021 5.30 (H)  3.77 - 5.28 10*6/mm3 Final   • Hemoglobin 11/11/2021 13.4  12.0 - 15.9 g/dL Final   • Hematocrit 11/11/2021 41.3  34.0 - 46.6 % Final   • MCV 11/11/2021 77.9 (L)  79.0 - 97.0 fL Final   • MCH 11/11/2021 25.3 (L)  26.6 - 33.0 pg Final   • MCHC 11/11/2021 32.4  31.5 - 35.7 g/dL Final   • RDW 11/11/2021 14.1  12.3 - 15.4 % Final   • RDW-SD 11/11/2021 38.8  37.0 - 54.0 fl Final   • MPV 11/11/2021 12.5 (H)  6.0 - 12.0 fL Final   • Platelets 11/11/2021 164  140 - 450 10*3/mm3 Final   • Neutrophil % 11/11/2021 74.8  42.7 - 76.0 % Final   • Lymphocyte % 11/11/2021 17.5 (L)  19.6 - 45.3 % Final   • Monocyte % 11/11/2021 4.7 (L)  5.0 - 12.0 % Final   • Eosinophil % 11/11/2021 2.3  0.3 - 6.2 % Final   • Basophil % 11/11/2021 0.3  0.0 - 1.5 % Final   • Immature Grans % 11/11/2021 0.4  0.0 - 0.5 % Final   • Neutrophils, Absolute 11/11/2021 7.75 (H)  1.70 - 7.00 10*3/mm3 Final   • Lymphocytes, Absolute 11/11/2021 1.81  0.70 - 3.10 10*3/mm3 Final   • Monocytes, Absolute 11/11/2021 0.49  0.10 - 0.90 10*3/mm3 Final   • Eosinophils, Absolute 11/11/2021 0.24  0.00 - 0.40 10*3/mm3 Final   • Basophils, Absolute 11/11/2021 0.03  0.00 - 0.20 10*3/mm3 Final   • Immature Grans, Absolute 11/11/2021 0.04  0.00 - 0.05 10*3/mm3 Final   • nRBC 11/11/2021 0.0  0.0 - 0.2 /100 WBC Final   • Interpretation 11/11/2021 Comment   Final    Negative  Not infected with HCV, unless recent infection is suspected or other  evidence exists to indicate HCV infection.      No image results found.      [email or web address]@    There is no immunization history on file for this patient.  The following portions of the patient's history were reviewed and updated as appropriate: allergies, current medications, past family history, past medical history, past social history, past surgical history and problem list.    PHQ-9 Total Score: 0          Physical Exam  Constitutional:       Appearance: Normal appearance.   HENT:      Head: Normocephalic and atraumatic.      Right Ear: External ear normal.      Left Ear: External ear normal.   Eyes:      General:         Right eye: No discharge.         Left eye: No discharge.      Conjunctiva/sclera: Conjunctivae normal.   Cardiovascular:      Rate and Rhythm: Normal rate and regular rhythm.      Pulses: Normal pulses.      Heart sounds: Normal heart sounds. No murmur heard.  Pulmonary:      Effort: Pulmonary effort is normal. No respiratory distress.      Breath sounds: Normal breath sounds.   Abdominal:      General: There is no distension.      Palpations: Abdomen is soft.      Tenderness: There is no abdominal tenderness.   Musculoskeletal:      Cervical back: Normal range of motion.      Right lower leg: No edema.      Left lower leg: No edema.   Lymphadenopathy:      Cervical: No cervical adenopathy.   Neurological:      Mental Status: She is alert. Mental status is at baseline.   Psychiatric:         Mood and Affect: Mood normal.         Behavior: Behavior normal.         Assessment & Plan    Diagnosis Plan   1. Primary hypertension  CBC & Differential    Comprehensive Metabolic Panel      2. Vitamin D deficiency  Calcium Carb-Cholecalciferol (Oyster Shell Calcium Plus D) 500-5 MG-MCG tablet per tablet      3. Essential hypertension  amLODIPine (NORVASC) 10 MG tablet      4. Vitamin B12 deficiency  vitamin B-12 (CYANOCOBALAMIN) 1000 MCG tablet      5. Lipid screening  Lipid Panel         Orders Placed This Encounter   Procedures   • Comprehensive Metabolic Panel     Order Specific Question:   Release to patient     Answer:   Immediate   • Lipid Panel   • CBC & Differential     Order Specific Question:   Manual Differential     Answer:   No     Hypertension; does not have issues with current medications, blood pressure has been well controlled.  Discussed importance of blood pressure control, risk of  uncontrolled hypertension, continue to work on diet, exercise, weight loss.  After discussion, will continue current management.  Follow-up in 3 months with blood pressure log or sooner if needed.  Given strict ER/cardiac precautions.            This document has been electronically signed by Moshe Villar MD on February 13, 2023 16:41 CST    EMR Dragon/Transciption Disclaimer: Some of this note may be an electronic transcription/translation of spoken language to printed text.  The electronic translation of spoken language may permit erroneous, or at times, nonsensical words or phrases to be inadvertently transcribed. Although I have reviewed the note for such errors, some may still exist.

## 2023-08-15 ENCOUNTER — OFFICE VISIT (OUTPATIENT)
Dept: FAMILY MEDICINE CLINIC | Facility: CLINIC | Age: 48
End: 2023-08-15
Payer: COMMERCIAL

## 2023-08-15 VITALS
HEIGHT: 62 IN | DIASTOLIC BLOOD PRESSURE: 70 MMHG | BODY MASS INDEX: 49.26 KG/M2 | WEIGHT: 267.7 LBS | OXYGEN SATURATION: 99 % | SYSTOLIC BLOOD PRESSURE: 128 MMHG | TEMPERATURE: 98.2 F | HEART RATE: 91 BPM

## 2023-08-15 DIAGNOSIS — E53.8 VITAMIN B12 DEFICIENCY: ICD-10-CM

## 2023-08-15 DIAGNOSIS — I10 ESSENTIAL HYPERTENSION: ICD-10-CM

## 2023-08-15 DIAGNOSIS — Z13.220 LIPID SCREENING: ICD-10-CM

## 2023-08-15 DIAGNOSIS — I10 PRIMARY HYPERTENSION: ICD-10-CM

## 2023-08-15 DIAGNOSIS — E55.9 VITAMIN D DEFICIENCY: Primary | ICD-10-CM

## 2023-08-15 DIAGNOSIS — D50.9 IRON DEFICIENCY ANEMIA, UNSPECIFIED IRON DEFICIENCY ANEMIA TYPE: ICD-10-CM

## 2023-08-15 PROCEDURE — 1159F MED LIST DOCD IN RCRD: CPT | Performed by: STUDENT IN AN ORGANIZED HEALTH CARE EDUCATION/TRAINING PROGRAM

## 2023-08-15 PROCEDURE — 1160F RVW MEDS BY RX/DR IN RCRD: CPT | Performed by: STUDENT IN AN ORGANIZED HEALTH CARE EDUCATION/TRAINING PROGRAM

## 2023-08-15 PROCEDURE — 99214 OFFICE O/P EST MOD 30 MIN: CPT | Performed by: STUDENT IN AN ORGANIZED HEALTH CARE EDUCATION/TRAINING PROGRAM

## 2023-08-15 RX ORDER — FERROUS SULFATE 325(65) MG
325 TABLET ORAL
Qty: 90 TABLET | Refills: 1 | Status: SHIPPED | OUTPATIENT
Start: 2023-08-15

## 2023-08-15 RX ORDER — CHLORTHALIDONE 25 MG/1
25 TABLET ORAL DAILY
Qty: 90 TABLET | Refills: 1 | Status: SHIPPED | OUTPATIENT
Start: 2023-08-15

## 2023-08-15 RX ORDER — LOSARTAN POTASSIUM 25 MG/1
25 TABLET ORAL DAILY
Qty: 90 TABLET | Refills: 1 | Status: SHIPPED | OUTPATIENT
Start: 2023-08-15

## 2023-08-15 RX ORDER — CALCIUM CARBONATE/VITAMIN D3 500MG-5MCG
1 TABLET ORAL DAILY
Qty: 90 TABLET | Refills: 1 | Status: SHIPPED | OUTPATIENT
Start: 2023-08-15

## 2023-08-15 RX ORDER — LANOLIN ALCOHOL/MO/W.PET/CERES
1000 CREAM (GRAM) TOPICAL DAILY
Qty: 90 TABLET | Refills: 3 | Status: SHIPPED | OUTPATIENT
Start: 2023-08-15 | End: 2023-08-17 | Stop reason: SDUPTHER

## 2023-08-15 RX ORDER — AMLODIPINE BESYLATE 10 MG/1
10 TABLET ORAL DAILY
Qty: 90 TABLET | Refills: 3 | Status: SHIPPED | OUTPATIENT
Start: 2023-08-15

## 2023-08-15 NOTE — PROGRESS NOTES
"Subjective:  Risa Vivar is a 48 y.o. female who presents for     Hypertension; currently on amlodipine 10 mg daily, chlorthalidone 25 mg daily.  States that blood pressure has been well controlled.  Does not have any issues with medications. Denies any cardiac symptoms, chest pain, shortness of breath, headaches, vision changes, numbness, tingling, weakness, leg swelling, orthopnea, dyspnea on exertion.      There is no problem list on file for this patient.    Vitals:    Vitals:    08/15/23 1611   BP: 128/70   Pulse: 91   Temp: 98.2 øF (36.8 øC)   TempSrc: Oral   SpO2: 99%   Weight: 121 kg (267 lb 11.2 oz)   Height: 157.5 cm (62\")     Body mass index is 48.96 kg/mý.      Current Outpatient Medications:     acetaminophen (TYLENOL) 500 MG tablet, Take 1 tablet by mouth Every 6 (Six) Hours As Needed for Mild Pain., Disp: 90 tablet, Rfl: 3    amLODIPine (NORVASC) 10 MG tablet, Take 1 tablet by mouth Daily., Disp: 90 tablet, Rfl: 3    Calcium Carb-Cholecalciferol (Oyster Shell Calcium Plus D) 500-5 MG-MCG tablet per tablet, Take 1 tablet by mouth Daily., Disp: 90 tablet, Rfl: 1    chlorthalidone (HYGROTON) 25 MG tablet, Take 1 tablet by mouth Daily., Disp: 90 tablet, Rfl: 1    ferrous sulfate 325 (65 FE) MG tablet, Take 1 tablet by mouth Daily With Breakfast., Disp: 90 tablet, Rfl: 1    losartan (Cozaar) 25 MG tablet, Take 1 tablet by mouth Daily., Disp: 90 tablet, Rfl: 1    vitamin B-12 (CYANOCOBALAMIN) 1000 MCG tablet, Take 1 tablet by mouth Daily., Disp: 90 tablet, Rfl: 0    There is no problem list on file for this patient.    Past Surgical History:   Procedure Laterality Date    CHOLECYSTECTOMY      HYSTERECTOMY      TUBAL ABDOMINAL LIGATION       Social History     Socioeconomic History    Marital status: Single   Tobacco Use    Smoking status: Former    Smokeless tobacco: Never   Vaping Use    Vaping Use: Never used   Substance and Sexual Activity    Alcohol use: Not Currently    Drug use: Never    Sexual " activity: Defer     Family History   Problem Relation Age of Onset    Kidney disease Mother     Kidney disease Father      No visits with results within 6 Month(s) from this visit.   Latest known visit with results is:   Office Visit on 11/11/2021   Component Date Value Ref Range Status    Glucose 11/11/2021 96  65 - 99 mg/dL Final    BUN 11/11/2021 8  6 - 20 mg/dL Final    Creatinine 11/11/2021 0.66  0.57 - 1.00 mg/dL Final    Sodium 11/11/2021 143  136 - 145 mmol/L Final    Potassium 11/11/2021 3.9  3.5 - 5.2 mmol/L Final    Chloride 11/11/2021 105  98 - 107 mmol/L Final    CO2 11/11/2021 23.8  22.0 - 29.0 mmol/L Final    Calcium 11/11/2021 9.4  8.6 - 10.5 mg/dL Final    Total Protein 11/11/2021 7.8  6.0 - 8.5 g/dL Final    Albumin 11/11/2021 4.00  3.50 - 5.20 g/dL Final    ALT (SGPT) 11/11/2021 9  1 - 33 U/L Final    AST (SGOT) 11/11/2021 10  1 - 32 U/L Final    Alkaline Phosphatase 11/11/2021 91  39 - 117 U/L Final    Total Bilirubin 11/11/2021 0.2  0.0 - 1.2 mg/dL Final    eGFR  African Amer 11/11/2021 118  >60 mL/min/1.73 Final    Globulin 11/11/2021 3.8  gm/dL Final    A/G Ratio 11/11/2021 1.1  g/dL Final    BUN/Creatinine Ratio 11/11/2021 12.1  7.0 - 25.0 Final    Anion Gap 11/11/2021 14.2  5.0 - 15.0 mmol/L Final    Total Cholesterol 11/11/2021 164  0 - 200 mg/dL Final    Triglycerides 11/11/2021 124  0 - 150 mg/dL Final    HDL Cholesterol 11/11/2021 49  40 - 60 mg/dL Final    LDL Cholesterol  11/11/2021 93  0 - 100 mg/dL Final    VLDL Cholesterol 11/11/2021 22  5 - 40 mg/dL Final    LDL/HDL Ratio 11/11/2021 1.84   Final    Hepatitis C Ab 11/11/2021 <0.1  0.0 - 0.9 s/co ratio Final    Ferritin 11/11/2021 24.40  13.00 - 150.00 ng/mL Final    Iron 11/11/2021 96  37 - 145 mcg/dL Final    Iron Saturation (TSAT) 11/11/2021 22  20 - 50 % Final    Transferrin 11/11/2021 297  200 - 360 mg/dL Final    TIBC 11/11/2021 443  298 - 536 mcg/dL Final    WBC 11/11/2021 10.36  3.40 - 10.80 10*3/mm3 Final    RBC 11/11/2021  5.30 (H)  3.77 - 5.28 10*6/mm3 Final    Hemoglobin 11/11/2021 13.4  12.0 - 15.9 g/dL Final    Hematocrit 11/11/2021 41.3  34.0 - 46.6 % Final    MCV 11/11/2021 77.9 (L)  79.0 - 97.0 fL Final    MCH 11/11/2021 25.3 (L)  26.6 - 33.0 pg Final    MCHC 11/11/2021 32.4  31.5 - 35.7 g/dL Final    RDW 11/11/2021 14.1  12.3 - 15.4 % Final    RDW-SD 11/11/2021 38.8  37.0 - 54.0 fl Final    MPV 11/11/2021 12.5 (H)  6.0 - 12.0 fL Final    Platelets 11/11/2021 164  140 - 450 10*3/mm3 Final    Neutrophil % 11/11/2021 74.8  42.7 - 76.0 % Final    Lymphocyte % 11/11/2021 17.5 (L)  19.6 - 45.3 % Final    Monocyte % 11/11/2021 4.7 (L)  5.0 - 12.0 % Final    Eosinophil % 11/11/2021 2.3  0.3 - 6.2 % Final    Basophil % 11/11/2021 0.3  0.0 - 1.5 % Final    Immature Grans % 11/11/2021 0.4  0.0 - 0.5 % Final    Neutrophils, Absolute 11/11/2021 7.75 (H)  1.70 - 7.00 10*3/mm3 Final    Lymphocytes, Absolute 11/11/2021 1.81  0.70 - 3.10 10*3/mm3 Final    Monocytes, Absolute 11/11/2021 0.49  0.10 - 0.90 10*3/mm3 Final    Eosinophils, Absolute 11/11/2021 0.24  0.00 - 0.40 10*3/mm3 Final    Basophils, Absolute 11/11/2021 0.03  0.00 - 0.20 10*3/mm3 Final    Immature Grans, Absolute 11/11/2021 0.04  0.00 - 0.05 10*3/mm3 Final    nRBC 11/11/2021 0.0  0.0 - 0.2 /100 WBC Final    Interpretation 11/11/2021 Comment   Final    Negative  Not infected with HCV, unless recent infection is suspected or other  evidence exists to indicate HCV infection.      No image results found.      [email or web address]    There is no immunization history on file for this patient.  The following portions of the patient's history were reviewed and updated as appropriate: allergies, current medications, past family history, past medical history, past social history, past surgical history and problem list.    PHQ-9 Total Score:           Physical Exam  Constitutional:       Appearance: Normal appearance.   HENT:      Head: Normocephalic and atraumatic.      Right Ear: External ear  normal.      Left Ear: External ear normal.   Eyes:      General:         Right eye: No discharge.         Left eye: No discharge.      Conjunctiva/sclera: Conjunctivae normal.   Cardiovascular:      Rate and Rhythm: Normal rate and regular rhythm.      Pulses: Normal pulses.      Heart sounds: Normal heart sounds. No murmur heard.  Pulmonary:      Effort: Pulmonary effort is normal. No respiratory distress.      Breath sounds: Normal breath sounds.   Abdominal:      General: There is no distension.      Palpations: Abdomen is soft.      Tenderness: There is no abdominal tenderness.   Musculoskeletal:      Cervical back: Normal range of motion.      Right lower leg: No edema.      Left lower leg: No edema.   Lymphadenopathy:      Cervical: No cervical adenopathy.   Neurological:      Mental Status: She is alert. Mental status is at baseline.   Psychiatric:         Mood and Affect: Mood normal.         Behavior: Behavior normal.       Assessment & Plan    Diagnosis Plan   1. Vitamin D deficiency  Vitamin D,25-Hydroxy    Calcium Carb-Cholecalciferol (Oyster Shell Calcium Plus D) 500-5 MG-MCG tablet per tablet      2. Primary hypertension  Comprehensive Metabolic Panel    chlorthalidone (HYGROTON) 25 MG tablet    losartan (Cozaar) 25 MG tablet      3. Vitamin B12 deficiency  Vitamin B12      4. Iron deficiency anemia, unspecified iron deficiency anemia type  CBC & Differential    Ferritin    Iron Profile    ferrous sulfate 325 (65 FE) MG tablet      5. Lipid screening  Lipid Panel      6. Essential hypertension  amLODIPine (NORVASC) 10 MG tablet         Orders Placed This Encounter   Procedures    Comprehensive Metabolic Panel     Order Specific Question:   Release to patient     Answer:   Routine Release [[id number]]    Ferritin     Order Specific Question:   Release to patient     Answer:   Routine Release [[id number]]    Iron Profile     Order Specific Question:   Release to patient     Answer:   Routine Release  [[id number]]    Vitamin D,25-Hydroxy     Order Specific Question:   Release to patient     Answer:   Routine Release [[id number]]    Lipid Panel     Order Specific Question:   Release to patient     Answer:   Routine Release [[id number]]    Vitamin B12     Order Specific Question:   Release to patient     Answer:   Routine Release [[id number]]    CBC & Differential     Order Specific Question:   Manual Differential     Answer:   No     Order Specific Question:   Release to patient     Answer:   Routine Release [[id number]]     Hypertension; does not have issues with current medications, blood pressure has been well controlled.  Discussed importance of blood pressure control, risk of uncontrolled hypertension, continue to work on diet, exercise, weight loss.  After discussion, will obtain labs as above, adjust treatment as needed/indicated, continue current management.  Follow-up in 6 months with blood pressure log or sooner if needed.  Given strict ER/cardiac precautions.            This document has been electronically signed by Moshe Villar MD on August 29, 2023 23:37 CDT    EMR Dragon/Transciption Disclaimer: Some of this note may be an electronic transcription/translation of spoken language to printed text.  The electronic translation of spoken language may permit erroneous, or at times, nonsensical words or phrases to be inadvertently transcribed. Although I have reviewed the note for such errors, some may still exist.        PAST MEDICAL HISTORY:  Arthritis OA    Constipation     Diabetes T2DM    DVT (deep venous thrombosis) chronic    Frequent UTI     HLD (hyperlipidemia)     Hypertension     Insomnia     Restless leg syndrome

## 2023-08-17 DIAGNOSIS — E53.8 VITAMIN B12 DEFICIENCY: ICD-10-CM

## 2023-08-17 RX ORDER — LANOLIN ALCOHOL/MO/W.PET/CERES
1000 CREAM (GRAM) TOPICAL DAILY
Qty: 90 TABLET | Refills: 0 | Status: SHIPPED | OUTPATIENT
Start: 2023-08-17